# Patient Record
Sex: FEMALE | URBAN - METROPOLITAN AREA
[De-identification: names, ages, dates, MRNs, and addresses within clinical notes are randomized per-mention and may not be internally consistent; named-entity substitution may affect disease eponyms.]

---

## 2021-11-03 ENCOUNTER — OFFICE VISIT (OUTPATIENT)
Dept: URBAN - METROPOLITAN AREA CLINIC 12 | Facility: CLINIC | Age: 66
End: 2021-11-03
Payer: MEDICARE

## 2021-11-03 DIAGNOSIS — K21.9 GERD: ICD-10-CM

## 2021-11-03 DIAGNOSIS — Z12.11 SCREEN FOR COLON CANCER: ICD-10-CM

## 2021-11-03 DIAGNOSIS — K52.9 COLITIS: ICD-10-CM

## 2021-11-03 PROCEDURE — 99204 OFFICE O/P NEW MOD 45 MIN: CPT | Performed by: INTERNAL MEDICINE

## 2021-11-03 PROCEDURE — 99244 OFF/OP CNSLTJ NEW/EST MOD 40: CPT | Performed by: INTERNAL MEDICINE

## 2021-11-03 RX ORDER — PANTOPRAZOLE SODIUM 20 MG/1
1 TABLET TABLET, DELAYED RELEASE ORAL ONCE A DAY
Status: ACTIVE | COMMUNITY

## 2021-11-03 NOTE — HPI-TODAY'S VISIT:
65 yo female referred by Dr. Lakhwinder Cantu  for recent hospital visit to er at [facility] for n/v/diarrhea/abdominal pain. a copy of this note will be sent to the referring physician -she had ct in er, found to have colitis- she was cipro and flagyl  which she took for 7 days.   -she feels that all of the symptoms seemed to resolved within a few days -she feels that her bm have been regular -no abdominal pain -no blood in the stool currently did have it when she went to the hospital -she is seeing Dr. Jono Phan regarding scleroderma and CREST syndrome.  he had recommended she see a GI for f/u as well -she has had reflux for more than 10 years at this time -she states that she saw a GI last year and had a ? cologuard. hasn't had endoscopy or colonoscopy before.  -deneis fh of colon cancer or polyps. father had stomach cancer at 97 -denies any abdominal pain, weight loss  -she is not taking any blood thinners at this point -she denies any sob with walking or climbing stairs

## 2021-11-10 PROBLEM — 64226004 COLITIS: Status: ACTIVE | Noted: 2021-11-03

## 2021-12-01 ENCOUNTER — OFFICE VISIT (OUTPATIENT)
Dept: URBAN - METROPOLITAN AREA CLINIC 35 | Facility: CLINIC | Age: 66
End: 2021-12-01

## 2021-12-01 RX ORDER — AMLODIPINE BESYLATE 5 MG/1
1 TABLET TABLET ORAL ONCE A DAY
Qty: 30 | Status: ACTIVE | COMMUNITY

## 2021-12-01 RX ORDER — HYDROXYCHLOROQUINE SULFATE 200 MG/1
TAKE 2 TABLETS PO BID FOR DAY 1 THEN 1 TABLET BID FOR DAYS 2-6 TABLET ORAL AS DIRECTED
Qty: 14 | Status: ACTIVE | COMMUNITY

## 2021-12-01 RX ORDER — PANTOPRAZOLE SODIUM 20 MG/1
1 TABLET TABLET, DELAYED RELEASE ORAL ONCE A DAY
Qty: 30 | Status: ACTIVE | COMMUNITY

## 2021-12-01 RX ORDER — ERGOCALCIFEROL 1.25 MG/1
1 CAPSULE CAPSULE, LIQUID FILLED ORAL
Qty: 4 | Status: ACTIVE | COMMUNITY

## 2021-12-01 RX ORDER — MYCOPHENOLATE MOFETIL 500 MG/1
1 TABLET TABLET, FILM COATED ORAL ONCE A DAY
Qty: 30 | Status: ACTIVE | COMMUNITY

## 2021-12-01 NOTE — HPI-MIGRATED HPI
;     Gastroesophageal Reflux : 66 female presents today for consult of GERD. Onset--. She describes her symptoms as--  She is currently taking Pantoprazole 20 mg daily. She takes it --, and has been on this regimen for--. She admits--% improvement of her symptoms.  She has tired--with/without relief.  No previous EGD/Colon?;

## 2021-12-02 ENCOUNTER — OFFICE VISIT (OUTPATIENT)
Dept: URBAN - METROPOLITAN AREA CLINIC 33 | Facility: CLINIC | Age: 66
End: 2021-12-02

## 2021-12-02 VITALS
SYSTOLIC BLOOD PRESSURE: 121 MMHG | HEART RATE: 51 BPM | BODY MASS INDEX: 22.93 KG/M2 | OXYGEN SATURATION: 97 % | WEIGHT: 124.6 LBS | HEIGHT: 62 IN | DIASTOLIC BLOOD PRESSURE: 78 MMHG

## 2021-12-02 RX ORDER — PANTOPRAZOLE SODIUM 20 MG/1
1 TABLET TABLET, DELAYED RELEASE ORAL ONCE A DAY
Qty: 30 | Status: ACTIVE | COMMUNITY

## 2021-12-02 RX ORDER — ERGOCALCIFEROL 1.25 MG/1
1 CAPSULE CAPSULE, LIQUID FILLED ORAL
Qty: 4 | Status: ACTIVE | COMMUNITY

## 2021-12-02 RX ORDER — SODIUM, POTASSIUM,MAG SULFATES 17.5-3.13G
177 ML SOLUTION, RECONSTITUTED, ORAL ORAL BID
Qty: 1 KIT | Refills: 0 | OUTPATIENT
Start: 2021-12-02

## 2021-12-02 RX ORDER — AMLODIPINE BESYLATE 5 MG/1
1 TABLET TABLET ORAL ONCE A DAY
Qty: 30 | Status: ACTIVE | COMMUNITY

## 2021-12-02 RX ORDER — MYCOPHENOLATE MOFETIL 500 MG/1
1 TABLET TABLET, FILM COATED ORAL ONCE A DAY
Qty: 30 | Status: ACTIVE | COMMUNITY

## 2021-12-02 RX ORDER — HYDROXYCHLOROQUINE SULFATE 200 MG/1
TAKE 2 TABLETS PO BID FOR DAY 1 THEN 1 TABLET BID FOR DAYS 2-6 TABLET ORAL AS DIRECTED
Qty: 14 | Status: ACTIVE | COMMUNITY

## 2021-12-02 NOTE — EXAM-MIGRATED EXAMINATIONS
GENERAL APPEARANCE: - alert, in no acute distress, well developed, well nourished;   HEAD: - normocephalic, atraumatic;   EYES: - sclera anicteric bilaterally;   ORAL CAVITY: - mucosa moist, MP 2;   THROAT: - clear;   NECK/THYROID: - neck supple, full range of motion, no thyromegaly, trachea midline;   SKIN: - warm and dry, no spider angiomata, palmar erythema or icterus;   HEART: - no murmurs, regular rate and rhythm, S1, S2 normal;   LUNGS: - clear to auscultation bilaterally, good air movement, no wheezes, rales, rhonchi;   ABDOMEN: - bowel sounds present, no masses palpable, no organomegaly , no rebound tenderness, soft, nontender, nondistended;   MUSCULOSKELETAL: - normal posture, normal gait and station, ;   EXTREMITIES: - there is edema and some clubbing in hands, no cyanosis, no edema on lower extremities;   PSYCH: - cooperative with exam, mood/affect full range;

## 2021-12-02 NOTE — HPI-MIGRATED HPI
;     Gastroesophageal Reflux : 66 female presents today for consult of GERD. Patient states she had these symptoms almost 10-15 years. She describes her symptoms as something pressing down on her throat.   She is currently taking Pantoprazole 20 mg daily q AM and has been taking it for 10 yrs. Patient admits to some improvement in her symptoms. She when belching she has pain in the chest area. Patient also admit she feels like she out of breath a lot.  Denies any symptoms of constipation, diarrhea, abdominal pain, bloating, gas, nausea and vomiting.  She denies previous EGD/Colon   Labs Completed 10.19.2021 WBC:4.6 RBC: 4.37 Hemoglobin: 14.2 Hematocrit:42.5 Platelet:341 Sodium: 144 Potassium: 5.2 High Bilirubin, Total: 0.29 Albumin: 3.8 Creatinine: 0.87   GI Note: Patient has been on Pantoprazole for yrs but because of worsening GERD symptoms, patient started taking OTC Zegerid BID and has been doing that for over 5 yrs. If she does not take Zegerid she feels she can not breath, severe heartburn, nausea but no emesis. No dysphagia. No constipation on a Probiotic. On that, she has daily BM's No blood in stool. Occasional dark stool. 2 months ago had episode of emesis and diarrhea, went to ER at [facility] and was prescribed antibiotic. Symptoms resolved.  Patient tells me had recent cardiac evaluation by Dr. Pimentel and all tests done were NL; this is per patient's history.  Patient states has a diagnosis of Raynaud's but her Rheumatologic hx os more complex than that. Referral to GI came from Dr. Jono Phan, her Rheumatologist and on my review of her records, patient has a hx of CREST Syndrome, unspecified polyarthritis. She is ARCHNAA positive, SSA positive and HLA B 27 positive. Patient saw Cardiology to r/o pulmonary HTN. Patient also was sent to us to r/o SB bacterial overgrowth. ;

## 2021-12-03 ENCOUNTER — TELEPHONE ENCOUNTER (OUTPATIENT)
Dept: URBAN - METROPOLITAN AREA CLINIC 35 | Facility: CLINIC | Age: 66
End: 2021-12-03

## 2021-12-14 PROBLEM — 235595009 GASTROESOPHAGEAL REFLUX DISEASE: Status: ACTIVE | Noted: 2021-11-03

## 2021-12-14 PROBLEM — 275978004 SCREENING FOR MALIGNANT NEOPLASM OF COLON: Status: ACTIVE | Noted: 2021-11-03

## 2021-12-22 ENCOUNTER — OFFICE VISIT (OUTPATIENT)
Dept: URBAN - METROPOLITAN AREA SURGERY CENTER 8 | Facility: SURGERY CENTER | Age: 66
End: 2021-12-22
Payer: MEDICARE

## 2021-12-22 ENCOUNTER — CLAIMS CREATED FROM THE CLAIM WINDOW (OUTPATIENT)
Dept: URBAN - METROPOLITAN AREA CLINIC 4 | Facility: CLINIC | Age: 66
End: 2021-12-22
Payer: MEDICARE

## 2021-12-22 DIAGNOSIS — K63.89 POLYP, HYPERPLASTIC: ICD-10-CM

## 2021-12-22 DIAGNOSIS — K22.719 BARRETT'S ESOPHAGUS WITH DYSPLASIA, UNSPECIFIED: ICD-10-CM

## 2021-12-22 DIAGNOSIS — R12 BURNING REFLUX: ICD-10-CM

## 2021-12-22 DIAGNOSIS — K21.9 GASTRO-ESOPHAGEAL REFLUX DISEASE WITHOUT ESOPHAGITIS: ICD-10-CM

## 2021-12-22 DIAGNOSIS — K62.6 ANAL ULCER: ICD-10-CM

## 2021-12-22 DIAGNOSIS — K63.3 ULCER OF INTESTINE: ICD-10-CM

## 2021-12-22 DIAGNOSIS — K63.89 BACTERIAL OVERGROWTH SYNDROME: ICD-10-CM

## 2021-12-22 DIAGNOSIS — D12.3 ADENOMA OF TRANSVERSE COLON: ICD-10-CM

## 2021-12-22 DIAGNOSIS — K31.89 DUODENAL ERYTHEMA: ICD-10-CM

## 2021-12-22 DIAGNOSIS — D12.3 BENIGN NEOPLASM OF TRANSVERSE COLON: ICD-10-CM

## 2021-12-22 DIAGNOSIS — K21.9 ACID REFLUX: ICD-10-CM

## 2021-12-22 DIAGNOSIS — K22.70 BARRETT ESOPHAGUS: ICD-10-CM

## 2021-12-22 DIAGNOSIS — K31.89 ACQUIRED DEFORMITY OF DUODENUM: ICD-10-CM

## 2021-12-22 PROCEDURE — 43239 EGD BIOPSY SINGLE/MULTIPLE: CPT | Performed by: INTERNAL MEDICINE

## 2021-12-22 PROCEDURE — 88305 TISSUE EXAM BY PATHOLOGIST: CPT | Performed by: PATHOLOGY

## 2021-12-22 PROCEDURE — G8907 PT DOC NO EVENTS ON DISCHARG: HCPCS | Performed by: INTERNAL MEDICINE

## 2021-12-22 PROCEDURE — 88312 SPECIAL STAINS GROUP 1: CPT | Performed by: PATHOLOGY

## 2021-12-22 PROCEDURE — 45380 COLONOSCOPY AND BIOPSY: CPT | Performed by: INTERNAL MEDICINE

## 2021-12-28 ENCOUNTER — TELEPHONE ENCOUNTER (OUTPATIENT)
Dept: URBAN - METROPOLITAN AREA CLINIC 36 | Facility: CLINIC | Age: 66
End: 2021-12-28

## 2021-12-28 RX ORDER — RABEPRAZOLE SODIUM 20 MG/1
1 TABLET TABLET, DELAYED RELEASE ORAL BID
Qty: 180 TABLET | Refills: 0 | OUTPATIENT
Start: 2021-12-28

## 2022-01-20 ENCOUNTER — OFFICE VISIT (OUTPATIENT)
Dept: URBAN - METROPOLITAN AREA CLINIC 33 | Facility: CLINIC | Age: 67
End: 2022-01-20
Payer: MEDICARE

## 2022-01-20 VITALS
HEIGHT: 62 IN | WEIGHT: 125 LBS | HEART RATE: 71 BPM | DIASTOLIC BLOOD PRESSURE: 60 MMHG | OXYGEN SATURATION: 98 % | SYSTOLIC BLOOD PRESSURE: 100 MMHG | BODY MASS INDEX: 23 KG/M2

## 2022-01-20 DIAGNOSIS — D12.3 ADENOMATOUS POLYP OF TRANSVERSE COLON: ICD-10-CM

## 2022-01-20 DIAGNOSIS — K21.9 GASTROESOPHAGEAL REFLUX DISEASE, UNSPECIFIED WHETHER ESOPHAGITIS PRESENT: ICD-10-CM

## 2022-01-20 DIAGNOSIS — K22.70 BARRETT'S ESOPHAGUS WITHOUT DYSPLASIA: ICD-10-CM

## 2022-01-20 DIAGNOSIS — K44.9 HIATAL HERNIA: ICD-10-CM

## 2022-01-20 DIAGNOSIS — K63.89 SMALL INTESTINAL BACTERIAL OVERGROWTH (SIBO): ICD-10-CM

## 2022-01-20 PROCEDURE — 99213 OFFICE O/P EST LOW 20 MIN: CPT | Performed by: INTERNAL MEDICINE

## 2022-01-20 RX ORDER — RABEPRAZOLE SODIUM 20 MG/1
1 TABLET TABLET, DELAYED RELEASE ORAL
Qty: 180 | Refills: 1 | OUTPATIENT
Start: 2022-01-20

## 2022-01-20 RX ORDER — MYCOPHENOLATE MOFETIL 500 MG/1
1 TABLET TABLET, FILM COATED ORAL ONCE A DAY
Qty: 30 | Status: ON HOLD | COMMUNITY

## 2022-01-20 RX ORDER — AMLODIPINE BESYLATE 5 MG/1
1 TABLET TABLET ORAL ONCE A DAY
Qty: 30 | Status: ACTIVE | COMMUNITY

## 2022-01-20 RX ORDER — RABEPRAZOLE SODIUM 20 MG/1
1 TABLET TABLET, DELAYED RELEASE ORAL BID
Qty: 180 TABLET | Refills: 0 | Status: ACTIVE | COMMUNITY
Start: 2021-12-28

## 2022-01-20 RX ORDER — ERGOCALCIFEROL 1.25 MG/1
1 CAPSULE CAPSULE, LIQUID FILLED ORAL
Qty: 4 | Status: ACTIVE | COMMUNITY

## 2022-01-20 RX ORDER — HYDROXYCHLOROQUINE SULFATE 200 MG/1
TAKE 2 TABLETS PO BID FOR DAY 1 THEN 1 TABLET BID FOR DAYS 2-6 TABLET ORAL AS DIRECTED
Qty: 14 | Status: ACTIVE | COMMUNITY

## 2022-01-20 RX ORDER — PANTOPRAZOLE SODIUM 20 MG/1
1 TABLET TABLET, DELAYED RELEASE ORAL ONCE A DAY
Qty: 30 | Status: ON HOLD | COMMUNITY

## 2022-01-20 RX ORDER — SODIUM, POTASSIUM,MAG SULFATES 17.5-3.13G
177 ML SOLUTION, RECONSTITUTED, ORAL ORAL BID
Qty: 1 KIT | Refills: 0 | Status: ON HOLD | COMMUNITY
Start: 2021-12-02

## 2022-01-20 NOTE — HPI-GERD
Patient presents today to review EGD/Colon completed on 12.22.21 and SIBO completed on 1/12/2022. She denies any complications following the procedure.   She continues on Rabeprazole 20 mg BID, but also has been taking Zegerid and Pantoprazole BID; states she did not understand instructions given to stop those. As long as she takes the medications she has improvement of chest pressure.  Left side upper chest area discomfort.   Patient  just finished yesterday a strong antibiotic BID for UTI for 5 days; cannot remember name on antibiotic.    NL BM's once a day No abdominal pain No bloating/gas.  EGD and path documented below: + HH and + Bustillos's esophagus  Colonoscopy and path documented below: the small polyp removed from transverse colon was an SSA (it is not on path report below).  SIBO was positive.  Last visit 12.2.2021   66 female presents today for consult of GERD. Patient states she had these symptoms almost 10-15 years. She describes her symptoms as something pressing down on her throat.         She is currently taking Pantoprazole 20 mg daily q AM and has been taking it for 10 yrs. Patient admits to some improvement in her symptoms. She when belching she has pain in the chest area. Patient also admit she feels like she out of breath a lot.        Denies any symptoms of constipation, diarrhea, abdominal pain, bloating, gas, nausea and vomiting.        She denies previous EGD/Colon        Labs Completed 10.19.2021        WBC:4.6        RBC: 4.37        Hemoglobin: 14.2        Hematocrit:42.5        Platelet:341        Sodium: 144        Potassium: 5.2 High        Bilirubin, Total: 0.29        Albumin: 3.8        Creatinine: 0.87        GI Note: Patient has been on Pantoprazole for yrs but because of worsening GERD symptoms, patient started taking OTC Zegerid BID and has been doing that for over 5 yrs.        If she does not take Zegerid she feels she can not breathe, severe heartburn, nausea but no emesis.        No dysphagia.        No constipation on a Probiotic. On that, she has daily BM's No blood in stool. Occasional dark stool.        2 months ago had episode of emesis and diarrhea, went to ER at [facility] and was prescribed antibiotic. Symptoms resolved.        Patient tells me had recent cardiac evaluation by Dr. Pimentel and all tests done were NL; this is per patient's history.        Patient states has a diagnosis of Raynaud's but her Rheumatologic hx os more complex than that. Referral to GI came from Dr. Jono Phan, her Rheumatologist and on my review of her records, patient has a hx of CREST Syndrome, unspecified polyarthritis. She is ARCHANA positive, SSA positive and HLA B 27 positive. Patient saw Cardiology to r/o pulmonary HTN. Patient also was sent to us to r/o SB bacterial overgrowth

## 2022-03-22 ENCOUNTER — TELEPHONE ENCOUNTER (OUTPATIENT)
Dept: URBAN - METROPOLITAN AREA CLINIC 35 | Facility: CLINIC | Age: 67
End: 2022-03-22

## 2022-03-22 RX ORDER — FAMOTIDINE 40 MG/1
1 TABLET MID DAY TABLET, FILM COATED ORAL ONCE A DAY
Qty: 30 TABLET | Refills: 1 | OUTPATIENT
Start: 2022-03-29

## 2022-03-24 ENCOUNTER — OFFICE VISIT (OUTPATIENT)
Dept: URBAN - METROPOLITAN AREA CLINIC 33 | Facility: CLINIC | Age: 67
End: 2022-03-24

## 2022-03-24 RX ORDER — SODIUM, POTASSIUM,MAG SULFATES 17.5-3.13G
177 ML SOLUTION, RECONSTITUTED, ORAL ORAL BID
Qty: 1 KIT | Refills: 0 | Status: ON HOLD | COMMUNITY
Start: 2021-12-02

## 2022-03-24 RX ORDER — RABEPRAZOLE SODIUM 20 MG/1
1 TABLET TABLET, DELAYED RELEASE ORAL BID
Qty: 180 TABLET | Refills: 0 | COMMUNITY
Start: 2021-12-28

## 2022-03-24 RX ORDER — PANTOPRAZOLE SODIUM 20 MG/1
1 TABLET TABLET, DELAYED RELEASE ORAL ONCE A DAY
Qty: 30 | Status: ON HOLD | COMMUNITY

## 2022-03-24 RX ORDER — AMLODIPINE BESYLATE 5 MG/1
1 TABLET TABLET ORAL ONCE A DAY
Qty: 30 | COMMUNITY

## 2022-03-24 RX ORDER — HYDROXYCHLOROQUINE SULFATE 200 MG/1
TAKE 2 TABLETS PO BID FOR DAY 1 THEN 1 TABLET BID FOR DAYS 2-6 TABLET ORAL AS DIRECTED
Qty: 14 | COMMUNITY

## 2022-03-24 RX ORDER — RABEPRAZOLE SODIUM 20 MG/1
1 TABLET TABLET, DELAYED RELEASE ORAL
Qty: 180 | Refills: 1 | COMMUNITY
Start: 2022-01-20

## 2022-03-24 RX ORDER — MYCOPHENOLATE MOFETIL 500 MG/1
1 TABLET TABLET, FILM COATED ORAL ONCE A DAY
Qty: 30 | Status: ON HOLD | COMMUNITY

## 2022-03-24 RX ORDER — ERGOCALCIFEROL 1.25 MG/1
1 CAPSULE CAPSULE, LIQUID FILLED ORAL
Qty: 4 | COMMUNITY

## 2022-03-24 NOTE — HPI-TODAY'S VISIT:
Patient present today for 2 month follow-up. Patient admits/denies any new symptoms. Currently, taking Rabeprazole 20mg with/out relief of symptoms. Admits/denies any symptoms of dysphagia, nausea, vomiting, change in appetite, and weight loss.

## 2022-03-31 ENCOUNTER — OFFICE VISIT (OUTPATIENT)
Dept: URBAN - METROPOLITAN AREA CLINIC 33 | Facility: CLINIC | Age: 67
End: 2022-03-31
Payer: MEDICARE

## 2022-03-31 ENCOUNTER — DASHBOARD ENCOUNTERS (OUTPATIENT)
Age: 67
End: 2022-03-31

## 2022-03-31 VITALS
BODY MASS INDEX: 23.63 KG/M2 | DIASTOLIC BLOOD PRESSURE: 67 MMHG | HEIGHT: 62 IN | HEART RATE: 78 BPM | SYSTOLIC BLOOD PRESSURE: 115 MMHG | OXYGEN SATURATION: 98 % | WEIGHT: 128.4 LBS

## 2022-03-31 DIAGNOSIS — K21.9 GASTROESOPHAGEAL REFLUX DISEASE, UNSPECIFIED WHETHER ESOPHAGITIS PRESENT: ICD-10-CM

## 2022-03-31 DIAGNOSIS — D12.3 ADENOMATOUS POLYP OF TRANSVERSE COLON: ICD-10-CM

## 2022-03-31 DIAGNOSIS — K22.70 BARRETT'S ESOPHAGUS WITHOUT DYSPLASIA: ICD-10-CM

## 2022-03-31 DIAGNOSIS — K44.9 HIATAL HERNIA: ICD-10-CM

## 2022-03-31 PROBLEM — 302914006: Status: ACTIVE | Noted: 2022-01-20

## 2022-03-31 PROBLEM — 235595009: Status: ACTIVE | Noted: 2021-12-02

## 2022-03-31 PROCEDURE — 99213 OFFICE O/P EST LOW 20 MIN: CPT | Performed by: INTERNAL MEDICINE

## 2022-03-31 RX ORDER — ERGOCALCIFEROL 1.25 MG/1
1 CAPSULE CAPSULE, LIQUID FILLED ORAL
Qty: 4 | Status: ACTIVE | COMMUNITY

## 2022-03-31 RX ORDER — AMLODIPINE BESYLATE 5 MG/1
1 TABLET TABLET ORAL ONCE A DAY
Qty: 30 | Status: ACTIVE | COMMUNITY

## 2022-03-31 RX ORDER — RABEPRAZOLE SODIUM 20 MG/1
1 TABLET TABLET, DELAYED RELEASE ORAL
Qty: 180 | Refills: 1 | Status: ACTIVE | COMMUNITY
Start: 2022-01-20

## 2022-03-31 RX ORDER — MYCOPHENOLATE MOFETIL 500 MG/1
1 TABLET TABLET ORAL BID
Status: ACTIVE | COMMUNITY

## 2022-03-31 RX ORDER — RABEPRAZOLE SODIUM 20 MG/1
1 TABLET TABLET, DELAYED RELEASE ORAL BID
Qty: 180 TABLET | Refills: 0 | Status: ON HOLD | COMMUNITY
Start: 2021-12-28

## 2022-03-31 RX ORDER — PANTOPRAZOLE SODIUM 20 MG/1
1 TABLET TABLET, DELAYED RELEASE ORAL ONCE A DAY
Qty: 30 | Status: ON HOLD | COMMUNITY

## 2022-03-31 RX ORDER — MYCOPHENOLATE MOFETIL 500 MG/1
1 TABLET TABLET, FILM COATED ORAL ONCE A DAY
Qty: 30 | Status: ON HOLD | COMMUNITY

## 2022-03-31 RX ORDER — HYDROXYCHLOROQUINE SULFATE 200 MG/1
TAKE 2 TABLETS PO BID FOR DAY 1 THEN 1 TABLET BID FOR DAYS 2-6 TABLET ORAL AS DIRECTED
Qty: 14 | Status: ACTIVE | COMMUNITY

## 2022-03-31 RX ORDER — SODIUM, POTASSIUM,MAG SULFATES 17.5-3.13G
177 ML SOLUTION, RECONSTITUTED, ORAL ORAL BID
Qty: 1 KIT | Refills: 0 | Status: ON HOLD | COMMUNITY
Start: 2021-12-02

## 2022-03-31 RX ORDER — RABEPRAZOLE SODIUM 20 MG/1
1 TABLET TABLET, DELAYED RELEASE ORAL
Qty: 180 | Refills: 1 | OUTPATIENT

## 2022-03-31 NOTE — HPI-TODAY'S VISIT:
Patient present today for 2 month follow-up. Patient states she is currently  taking Rabeprazole 20 mg BID. Patient states her symptoms are good for now. She denies any symptom of nausea, vomiting, change in appetite, dysphagia, heartburn or Globus.    GI MD Note: compared to last week, she is much better.  Dr. Jono Phan her Rheumatologist, called me last week about patient being in his office c/o severe GERD symptoms, patient felt as she could not breathe etc. She tells me felt like a rock sitting on her throat. I sent message to patient about making sure she was taking her Rabeprazole BID, added Famotidine at bedtime and made a f/u appointment. Patient has not picked up Famotidine from pharmacy yet, but since, patient has increased water intake, decreased intake of sugar and eating smaller and more frequent meals, she is taking Rabeprazole AM and evening and overall states she feels much better.  No complaints today, GERD symptoms are under control.  She is having NL daily BM's

## 2022-06-15 ENCOUNTER — TELEPHONE ENCOUNTER (OUTPATIENT)
Dept: URBAN - METROPOLITAN AREA CLINIC 36 | Facility: CLINIC | Age: 67
End: 2022-06-15

## 2022-06-22 ENCOUNTER — TELEPHONE ENCOUNTER (OUTPATIENT)
Dept: URBAN - METROPOLITAN AREA CLINIC 23 | Facility: CLINIC | Age: 67
End: 2022-06-22

## 2022-07-21 ENCOUNTER — OFFICE VISIT (OUTPATIENT)
Dept: URBAN - METROPOLITAN AREA CLINIC 33 | Facility: CLINIC | Age: 67
End: 2022-07-21

## 2024-06-20 ENCOUNTER — TELEPHONE ENCOUNTER (OUTPATIENT)
Dept: URBAN - METROPOLITAN AREA CLINIC 33 | Facility: CLINIC | Age: 69
End: 2024-06-20